# Patient Record
Sex: FEMALE | Race: BLACK OR AFRICAN AMERICAN | Employment: FULL TIME | URBAN - METROPOLITAN AREA
[De-identification: names, ages, dates, MRNs, and addresses within clinical notes are randomized per-mention and may not be internally consistent; named-entity substitution may affect disease eponyms.]

---

## 2019-01-17 ENCOUNTER — HOSPITAL ENCOUNTER (OUTPATIENT)
Dept: PREADMISSION TESTING | Age: 44
Discharge: HOME | End: 2019-01-17
Payer: COMMERCIAL

## 2019-01-17 VITALS — BODY MASS INDEX: 24.11 KG/M2 | WEIGHT: 150 LBS | HEIGHT: 66 IN

## 2019-01-17 LAB
HCT VFR BLD AUTO: 34.2 % (ref 35–45)
HGB BLD-MCNC: 10.4 G/DL (ref 12–16)

## 2019-01-17 PROCEDURE — 85018 HEMOGLOBIN: CPT

## 2019-01-17 RX ORDER — BISMUTH SUBSALICYLATE 262 MG
1 TABLET,CHEWABLE ORAL DAILY
COMMUNITY

## 2019-01-17 RX ORDER — SODIUM CHLORIDE, SODIUM LACTATE, POTASSIUM CHLORIDE, CALCIUM CHLORIDE 600; 310; 30; 20 MG/100ML; MG/100ML; MG/100ML; MG/100ML
125 INJECTION, SOLUTION INTRAVENOUS CONTINUOUS
Status: CANCELLED | OUTPATIENT
Start: 2019-01-17

## 2019-01-17 NOTE — PERIOP NOTES
Hold supplements 2 weeks prior to dosDenies any prostheticsDenies sleep apneaPatient states family physician is aware of upcoming procedure/surgeryDenies family history of anesthesia complicationsDenies shortness of breath nor chest pain while climbing stairsPlease be aware that due to unforeseen circumstances, delays may occur and your patience will be appreciatedPlease remember to bring in your copay on dosDo not put any lotion, jewelry, makeup, fingernail or toenail polish; no wigs, no private piercings; no tictac,gum and mouthwash

## 2019-01-30 NOTE — H&P
Assessment/Plan # Detail Type Description 1. Assessment Localized swelling, mass and lump, trunk (R22.2). Impression likely benign lipoma, but getting larger, some discomfort, discussed excision in OR, risks and benefits. Patient Plan excision right upper back mass This 44year old female presents for Lipoma. History of Present Illness: 1. Lipoma The swelling occurred 1 year ago and is constant. The severity is moderate and has worsened. The patient denies any history of trauma. Back. Previous diagnostic studies and/or treatments that have been performed/administered include. Previous diagnostic studies and/or treatments that have not been performed/administered include a biopsy, an excision and radiographs. The swelling is aggravated by local pressure and movement. Interventions the patient has tried have not provided any relief. The patient denies any bleeding, chest pain, dyspnea, fever, headache, lymphadenopathy, numbness, skin discoloration and weight loss. Medical/Surgical/Interim History Reviewed, no change. Last detailed document date:11/14/2018. Family History: 
Reviewed, no changes. Last detailed document date:11/14/2018. Social History: 
Reviewed, no changes. Last detailed document date: 11/14/2018. Medication Reconciliation Medications reconciled today. Allergies: 
Ingredient Reaction (Severity) Medication Name Comment NO KNOWN ALLERGIES Reviewed, no changes. Review of Systems System Neg/Pos Details Constitutional Negative Fever, Night sweats and Weight loss. ENMT Negative Hearing loss, Tinnitus, Vertigo and Voice change. Eyes Negative Diplopia and Vision loss. Respiratory Negative Asthma, Cough, Dyspnea, Hemoptysis, Known TB exposure and Wheezing. Cardio Negative Chest pain, Claudication, Edema, Irregular heartbeat/palpitations and Thrombophlebitis. GI Negative Bloating, Dysphagia, Hemorrhoids, Jaundice and Reflux.  Negative Dysuria, Nocturia, Passage stone/gravel and Urinary incontinence. Endocrine Negative Cold intolerance and Goiter. Neuro Negative Focal weakness, Headache, Numbness, Paresthesia, Seizures and Syncope. Integumentary Negative Change in shape/size of mole(s), Pigment change and Skin lesion. MS Negative Back pain, Bone/joint symptoms and Muscle weakness. Hema/Lymph Negative Easy bleeding, Easy bruising and Lymphadenopathy. Allergic/Immuno Negative Contact allergy and Contact dermatitis. Reproductive Positive Menses (Last menses was 11/23/2018). Reproductive Comments Mammo 1/18. Vital Signs Gynecologic History Last menses was 11/23/2018. Height Time ft in cm Last Measured Height Position 1:45 PM 5.0 4.00 162.56 11/27/2018 Standing Weight/BSA/BMI Time lb oz kg Context BMI kg/m2 BSA m2  
1:45 .00  68.039 dressed with shoes 25.75 Blood Pressure Time BP mm/Hg Position Side Site Method Cuff Size 1:45 /75 sitting right arm automatic adult Temperature/Pulse/Respiration Time Temp F Temp C Temp Site Pulse/min Pattern Resp/ min 1:45 PM 98.20 36.78 ear 70 regular Measured By Time Measured by  
1:45 PM Georgina Jimenez Physical Exam: 
Exam Findings Details Back/Spine Comments R upper back with vague 6 cm soft subq mass Constitutional Normal No acute distress. Well nourished. Well developed. Eyes Normal General - Right: Normal, Left: Normal. Sclera - Right: Normal, Left: Normal.  
Neck Exam Normal Inspection - Normal.  
Respiratory Normal Cough - Absent. Effort - Normal.  
Cardiovascular Normal Inspection - JVD: Absent. Heart rate - Regular rate. Rhythm - Regular. Skin * Inspection - General inspection: warm and dry. Musculoskeletal Normal Visual overview of all four extremities is normal. Gait - Normal.  
Extremity Normal No Cyanosis. No Edema.   
Neurological Normal Level of consciousness - Normal. Orientation - Normal. Memory - Normal.  
 Psychiatric Normal Orientation - Oriented to time, place, person & situation. No agitation. Not anxious. Appropriate mood and affect. Active Patient Care Team Members Name Contact Agency Type Support Role Relationship Active Date Inactive Date Specialty Becky Provider: Adelfo Griffith 11/28/2018 9:36 AM  
Document generated by:  Frantz Hinson 11/28/2018 09:36 AM  
 
 
 
 
 
 
 
 
 
 
 
 
Electronically signed by Frantz Hinson MD on 11/29/2018 04:34 PM

## 2019-01-31 ENCOUNTER — ANESTHESIA (OUTPATIENT)
Dept: SURGERY | Age: 44
End: 2019-01-31
Payer: COMMERCIAL

## 2019-01-31 ENCOUNTER — ANESTHESIA EVENT (OUTPATIENT)
Dept: SURGERY | Age: 44
End: 2019-01-31
Payer: COMMERCIAL

## 2019-01-31 ENCOUNTER — HOSPITAL ENCOUNTER (OUTPATIENT)
Age: 44
Setting detail: OUTPATIENT SURGERY
Discharge: HOME | End: 2019-01-31
Attending: SURGERY | Admitting: SURGERY
Payer: COMMERCIAL

## 2019-01-31 VITALS
DIASTOLIC BLOOD PRESSURE: 71 MMHG | SYSTOLIC BLOOD PRESSURE: 114 MMHG | BODY MASS INDEX: 26.01 KG/M2 | RESPIRATION RATE: 18 BRPM | HEART RATE: 76 BPM | OXYGEN SATURATION: 100 % | HEIGHT: 65 IN | TEMPERATURE: 97.6 F | WEIGHT: 156.13 LBS

## 2019-01-31 DIAGNOSIS — D17.1 LIPOMA OF BACK: Primary | ICD-10-CM

## 2019-01-31 LAB — HCG UR QL: NEGATIVE

## 2019-01-31 PROCEDURE — 77030020782 HC GWN BAIR PAWS FLX 3M -B: Performed by: SURGERY

## 2019-01-31 PROCEDURE — 74011000250 HC RX REV CODE- 250: Performed by: SURGERY

## 2019-01-31 PROCEDURE — 74011250636 HC RX REV CODE- 250/636: Performed by: SURGERY

## 2019-01-31 PROCEDURE — 76060000031 HC ANESTHESIA FIRST 0.5 HR: Performed by: SURGERY

## 2019-01-31 PROCEDURE — 77030008556 HC TBNG SMK EVAC COVD -A: Performed by: SURGERY

## 2019-01-31 PROCEDURE — 74011000250 HC RX REV CODE- 250

## 2019-01-31 PROCEDURE — 81025 URINE PREGNANCY TEST: CPT

## 2019-01-31 PROCEDURE — 76010000154 HC OR TIME FIRST 0.5 HR: Performed by: SURGERY

## 2019-01-31 PROCEDURE — 77030002933 HC SUT MCRYL J&J -A: Performed by: SURGERY

## 2019-01-31 PROCEDURE — 88304 TISSUE EXAM BY PATHOLOGIST: CPT

## 2019-01-31 PROCEDURE — 76210000021 HC REC RM PH II 0.5 TO 1 HR: Performed by: SURGERY

## 2019-01-31 PROCEDURE — 77030011267 HC ELECTRD BLD COVD -A: Performed by: SURGERY

## 2019-01-31 PROCEDURE — 76210000063 HC OR PH I REC FIRST 0.5 HR: Performed by: SURGERY

## 2019-01-31 PROCEDURE — 74011250636 HC RX REV CODE- 250/636

## 2019-01-31 PROCEDURE — 77030032490 HC SLV COMPR SCD KNE COVD -B: Performed by: SURGERY

## 2019-01-31 RX ORDER — MAGNESIUM SULFATE 100 %
4 CRYSTALS MISCELLANEOUS AS NEEDED
Status: CANCELLED | OUTPATIENT
Start: 2019-01-31

## 2019-01-31 RX ORDER — DEXTROSE 50 % IN WATER (D50W) INTRAVENOUS SYRINGE
25-50 AS NEEDED
Status: CANCELLED | OUTPATIENT
Start: 2019-01-31

## 2019-01-31 RX ORDER — INSULIN LISPRO 100 [IU]/ML
INJECTION, SOLUTION INTRAVENOUS; SUBCUTANEOUS ONCE
Status: CANCELLED | OUTPATIENT
Start: 2019-01-31 | End: 2019-01-31

## 2019-01-31 RX ORDER — SODIUM CHLORIDE, SODIUM LACTATE, POTASSIUM CHLORIDE, CALCIUM CHLORIDE 600; 310; 30; 20 MG/100ML; MG/100ML; MG/100ML; MG/100ML
125 INJECTION, SOLUTION INTRAVENOUS CONTINUOUS
Status: DISCONTINUED | OUTPATIENT
Start: 2019-01-31 | End: 2019-01-31 | Stop reason: HOSPADM

## 2019-01-31 RX ORDER — FENTANYL CITRATE 50 UG/ML
INJECTION, SOLUTION INTRAMUSCULAR; INTRAVENOUS AS NEEDED
Status: DISCONTINUED | OUTPATIENT
Start: 2019-01-31 | End: 2019-01-31 | Stop reason: HOSPADM

## 2019-01-31 RX ORDER — BUPIVACAINE HYDROCHLORIDE 5 MG/ML
INJECTION, SOLUTION EPIDURAL; INTRACAUDAL AS NEEDED
Status: DISCONTINUED | OUTPATIENT
Start: 2019-01-31 | End: 2019-01-31 | Stop reason: HOSPADM

## 2019-01-31 RX ORDER — NALOXONE HYDROCHLORIDE 0.4 MG/ML
0.2 INJECTION, SOLUTION INTRAMUSCULAR; INTRAVENOUS; SUBCUTANEOUS AS NEEDED
Status: CANCELLED | OUTPATIENT
Start: 2019-01-31

## 2019-01-31 RX ORDER — SODIUM CHLORIDE 0.9 % (FLUSH) 0.9 %
5-40 SYRINGE (ML) INJECTION EVERY 8 HOURS
Status: CANCELLED | OUTPATIENT
Start: 2019-01-31

## 2019-01-31 RX ORDER — SODIUM CHLORIDE 0.9 % (FLUSH) 0.9 %
5-40 SYRINGE (ML) INJECTION AS NEEDED
Status: CANCELLED | OUTPATIENT
Start: 2019-01-31

## 2019-01-31 RX ORDER — PROPOFOL 10 MG/ML
INJECTION, EMULSION INTRAVENOUS
Status: DISCONTINUED | OUTPATIENT
Start: 2019-01-31 | End: 2019-01-31 | Stop reason: HOSPADM

## 2019-01-31 RX ORDER — FENTANYL CITRATE 50 UG/ML
25 INJECTION, SOLUTION INTRAMUSCULAR; INTRAVENOUS
Status: CANCELLED | OUTPATIENT
Start: 2019-01-31

## 2019-01-31 RX ORDER — EPHEDRINE SULFATE/0.9% NACL/PF 25 MG/5 ML
SYRINGE (ML) INTRAVENOUS AS NEEDED
Status: DISCONTINUED | OUTPATIENT
Start: 2019-01-31 | End: 2019-01-31 | Stop reason: HOSPADM

## 2019-01-31 RX ORDER — ONDANSETRON 2 MG/ML
INJECTION INTRAMUSCULAR; INTRAVENOUS AS NEEDED
Status: DISCONTINUED | OUTPATIENT
Start: 2019-01-31 | End: 2019-01-31 | Stop reason: HOSPADM

## 2019-01-31 RX ORDER — MIDAZOLAM HYDROCHLORIDE 1 MG/ML
INJECTION, SOLUTION INTRAMUSCULAR; INTRAVENOUS AS NEEDED
Status: DISCONTINUED | OUTPATIENT
Start: 2019-01-31 | End: 2019-01-31 | Stop reason: HOSPADM

## 2019-01-31 RX ORDER — SODIUM CHLORIDE, SODIUM LACTATE, POTASSIUM CHLORIDE, CALCIUM CHLORIDE 600; 310; 30; 20 MG/100ML; MG/100ML; MG/100ML; MG/100ML
100 INJECTION, SOLUTION INTRAVENOUS CONTINUOUS
Status: CANCELLED | OUTPATIENT
Start: 2019-01-31

## 2019-01-31 RX ORDER — HYDROCODONE BITARTRATE AND ACETAMINOPHEN 5; 300 MG/1; MG/1
1 TABLET ORAL
Qty: 25 TAB | Refills: 0 | Status: SHIPPED | OUTPATIENT
Start: 2019-01-31

## 2019-01-31 RX ADMIN — MIDAZOLAM HYDROCHLORIDE 2 MG: 1 INJECTION, SOLUTION INTRAMUSCULAR; INTRAVENOUS at 08:40

## 2019-01-31 RX ADMIN — Medication 10 MG: at 09:06

## 2019-01-31 RX ADMIN — FENTANYL CITRATE 50 MCG: 50 INJECTION, SOLUTION INTRAMUSCULAR; INTRAVENOUS at 08:40

## 2019-01-31 RX ADMIN — PROPOFOL 100 MCG/KG/MIN: 10 INJECTION, EMULSION INTRAVENOUS at 08:45

## 2019-01-31 RX ADMIN — SODIUM CHLORIDE, SODIUM LACTATE, POTASSIUM CHLORIDE, AND CALCIUM CHLORIDE 125 ML/HR: 600; 310; 30; 20 INJECTION, SOLUTION INTRAVENOUS at 06:43

## 2019-01-31 RX ADMIN — FENTANYL CITRATE 50 MCG: 50 INJECTION, SOLUTION INTRAMUSCULAR; INTRAVENOUS at 08:45

## 2019-01-31 RX ADMIN — ONDANSETRON 4 MG: 2 INJECTION INTRAMUSCULAR; INTRAVENOUS at 08:40

## 2019-01-31 NOTE — PERIOP NOTES
Arrived to Phase 2 - alert but drowsy - assisted to recliner - surgical dressing clean and dry- ice pack applied

## 2019-01-31 NOTE — PERIOP NOTES
Urine pregnancy results Negative and verified with LAYLA Leavitt Dual skin assessment completed by Jemima OCONNOR and Frankie Epps RN.   
Esther Eden

## 2019-01-31 NOTE — INTERVAL H&P NOTE
H&P Update: 
Charlotte Coker was seen and examined. History and physical has been reviewed. The patient has been examined. There have been no significant clinical changes since the completion of the originally dated History and Physical. 
Patient identified by surgeon; surgical site was confirmed by patient and surgeon.  
 
Signed By: Clari Gorman MD   
 January 31, 2019 8:36 AM

## 2019-01-31 NOTE — ANESTHESIA PREPROCEDURE EVALUATION
Anesthetic History No history of anesthetic complications Review of Systems / Medical History Patient summary reviewed, nursing notes reviewed and pertinent labs reviewed Pulmonary Within defined limits Neuro/Psych Within defined limits Cardiovascular Within defined limits GI/Hepatic/Renal 
Within defined limits Endo/Other Within defined limits Other Findings Physical Exam 
 
Airway Mallampati: II 
TM Distance: 4 - 6 cm Neck ROM: normal range of motion Mouth opening: Normal 
 
 Cardiovascular Rhythm: regular Rate: normal 
 
 
 
 Dental 
 
 
Comments: Lower retainer. Pulmonary Breath sounds clear to auscultation Abdominal 
GI exam deferred Other Findings Anesthetic Plan ASA: 1 Anesthesia type: MAC Induction: Intravenous Anesthetic plan and risks discussed with: Patient and Family

## 2019-01-31 NOTE — OP NOTES
[address] REPORT    Negrito Weinstein.  MR#: [id number]  : 1975  ACCOUNT #: [de-identified]   DATE OF SERVICE: 2019    PREOPERATIVE DIAGNOSIS:  Right upper back mass. POSTOPERATIVE DIAGNOSIS:  Submuscular lipoma, right upper back, measuring 5 cm. PROCEDURE PERFORMED:  Excision of submuscular lipoma, right upper back, measuring 5 cm. SURGEON:  Tutu Amezquita MD    ANESTHESIA:  MAC.    INDICATION FOR PROCEDURE:  This is a 62-year-old female with a relatively large mass in the upper back, which has gotten larger, and she is brought to the operating room for excision. DESCRIPTION OF THE PROCEDURE:  The patient was brought to the operating room and placed on the table in the left lateral decubitus position. After placing monitors and adequate IV sedation,  the right upper back was prepped and draped in the usual sterile fashion. Marcaine was injected locally in the skin and subcutaneous tissue. A slightly oblique incision was made through the skin down to subcutaneous tissue. Dissection went to the muscle. It was obvious that the lipoma was deep to the muscle. The muscle was then divided in the direction of the fibers and retracted. Then, a lipomatous mass was excised from the surrounding tissue. It was sent to Pathology in formalin. There was good hemostasis with the electrocautery. Subcutaneous tissue was reapproximated with interrupted 3-0 Monocryl suture and 4-0 Monocryl was used to reapproximate the skin. Steri-Strips were applied and the wound was dressed sterilely. The sponge, instrument and needle count was correct at the end of the procedure. ESTIMATED BLOOD LOSS:  3 mL. SPECIMENS REMOVED:  Lipoma. DRAINS:  No drains. IMPLANTS:  No implants. COMPLICATIONS:  No complications. ASSISTANT:  Mustapha Bradley. MD Al Daina / Dora Van  D: 2019 15:36     T: 2019 15:53  JOB #: [id number]

## 2019-01-31 NOTE — DISCHARGE INSTRUCTIONS
Ice and elevation  Ambulate multiple times daily  Resume pre hospital diet  Drink plenty of fluids  Remove surgical dressing and shower in 2 days  Take prescription as directed  Follow up with Dr. Priti Lozano in 1 week        DISCHARGE SUMMARY from Nurse    PATIENT INSTRUCTIONS:    After general anesthesia or intravenous sedation, for 24 hours or while taking prescription Narcotics:  · Limit your activities  · Do not drive and operate hazardous machinery  · Do not make important personal or business decisions  · Do  not drink alcoholic beverages  · If you have not urinated within 8 hours after discharge, please contact your surgeon on call. Report the following to your surgeon:  · Excessive pain, swelling, redness or odor of or around the surgical area  · Temperature over 100.5  · Nausea and vomiting lasting longer than 4 hours or if unable to take medications  · Any signs of decreased circulation or nerve impairment to extremity: change in color, persistent  numbness, tingling, coldness or increase pain  · Any questions    What to do at Home:  Recommended activity: Ambulate in house and No driving while on analgesics,     If you experience any of the following symptoms fever, chills, uncontrollable pain , active bleeding or drainage, please follow up with Dr. Priti Lozano. *  Please give a list of your current medications to your Primary Care Provider. *  Please update this list whenever your medications are discontinued, doses are      changed, or new medications (including over-the-counter products) are added. *  Please carry medication information at all times in case of emergency situations. These are general instructions for a healthy lifestyle:    No smoking/ No tobacco products/ Avoid exposure to second hand smoke  Surgeon General's Warning:  Quitting smoking now greatly reduces serious risk to your health.     Obesity, smoking, and sedentary lifestyle greatly increases your risk for illness    A healthy diet, regular physical exercise & weight monitoring are important for maintaining a healthy lifestyle    You may be retaining fluid if you have a history of heart failure or if you experience any of the following symptoms:  Weight gain of 3 pounds or more overnight or 5 pounds in a week, increased swelling in our hands or feet or shortness of breath while lying flat in bed. Please call your doctor as soon as you notice any of these symptoms; do not wait until your next office visit. Recognize signs and symptoms of STROKE:    F-face looks uneven    A-arms unable to move or move unevenly    S-speech slurred or non-existent    T-time-call 911 as soon as signs and symptoms begin-DO NOT go       Back to bed or wait to see if you get better-TIME IS BRAIN. Warning Signs of HEART ATTACK     Call 911 if you have these symptoms:   Chest discomfort. Most heart attacks involve discomfort in the center of the chest that lasts more than a few minutes, or that goes away and comes back. It can feel like uncomfortable pressure, squeezing, fullness, or pain.  Discomfort in other areas of the upper body. Symptoms can include pain or discomfort in one or both arms, the back, neck, jaw, or stomach.  Shortness of breath with or without chest discomfort.  Other signs may include breaking out in a cold sweat, nausea, or lightheadedness. Don't wait more than five minutes to call 911 - MINUTES MATTER! Fast action can save your life. Calling 911 is almost always the fastest way to get lifesaving treatment. Emergency Medical Services staff can begin treatment when they arrive -- up to an hour sooner than if someone gets to the hospital by car. Patient armband removed and shredded    The discharge information has been reviewed with the patient and caregiver. The patient and caregiver verbalized understanding.   Discharge medications reviewed with the patient and caregiver and appropriate educational materials and side effects teaching were provided.   ___________________________________________________________________________________________________________________________________

## 2019-01-31 NOTE — ANESTHESIA POSTPROCEDURE EVALUATION
. 
Post-Anesthesia Evaluation & Assessment Visit Vitals /69 Pulse 71 Temp 36.2 °C (97.1 °F) Resp 14 Ht 5' 4.5\" (1.638 m) Wt 70.8 kg (156 lb 2 oz) SpO2 99% BMI 26.38 kg/m² Nausea/Vomiting: no nausea Post-operative hydration adequate. Pain score (VAS): 0 Mental status & Level of consciousness: alert and oriented x 3 Neurological status: moves all extremities, sensation grossly intact Pulmonary status: airway patent, no supplemental oxygen required Complications related to anesthesia: none Additional comments:

## 2023-02-23 ENCOUNTER — HOSPITAL ENCOUNTER (OUTPATIENT)
Facility: HOSPITAL | Age: 48
Setting detail: OUTPATIENT SURGERY
Discharge: HOME | End: 2023-02-23
Attending: INTERNAL MEDICINE | Admitting: INTERNAL MEDICINE
Payer: COMMERCIAL

## 2023-02-23 ENCOUNTER — ANESTHESIA EVENT (OUTPATIENT)
Facility: HOSPITAL | Age: 48
End: 2023-02-23
Payer: COMMERCIAL

## 2023-02-23 ENCOUNTER — ANESTHESIA (OUTPATIENT)
Facility: HOSPITAL | Age: 48
End: 2023-02-23
Payer: COMMERCIAL

## 2023-02-23 VITALS
TEMPERATURE: 98.2 F | HEIGHT: 65 IN | BODY MASS INDEX: 25.22 KG/M2 | RESPIRATION RATE: 14 BRPM | HEART RATE: 69 BPM | WEIGHT: 151.4 LBS | DIASTOLIC BLOOD PRESSURE: 80 MMHG | OXYGEN SATURATION: 100 % | SYSTOLIC BLOOD PRESSURE: 129 MMHG

## 2023-02-23 PROCEDURE — 2580000003 HC RX 258: Performed by: ANESTHESIOLOGY

## 2023-02-23 PROCEDURE — 7100000010 HC PHASE II RECOVERY - FIRST 15 MIN: Performed by: INTERNAL MEDICINE

## 2023-02-23 PROCEDURE — 3600007512: Performed by: INTERNAL MEDICINE

## 2023-02-23 PROCEDURE — 3700000001 HC ADD 15 MINUTES (ANESTHESIA): Performed by: INTERNAL MEDICINE

## 2023-02-23 PROCEDURE — 2709999900 HC NON-CHARGEABLE SUPPLY: Performed by: INTERNAL MEDICINE

## 2023-02-23 PROCEDURE — 2500000003 HC RX 250 WO HCPCS: Performed by: NURSE ANESTHETIST, CERTIFIED REGISTERED

## 2023-02-23 PROCEDURE — 7100000011 HC PHASE II RECOVERY - ADDTL 15 MIN: Performed by: INTERNAL MEDICINE

## 2023-02-23 PROCEDURE — 88305 TISSUE EXAM BY PATHOLOGIST: CPT

## 2023-02-23 PROCEDURE — 3700000000 HC ANESTHESIA ATTENDED CARE: Performed by: INTERNAL MEDICINE

## 2023-02-23 PROCEDURE — 3600007502: Performed by: INTERNAL MEDICINE

## 2023-02-23 PROCEDURE — 6360000002 HC RX W HCPCS: Performed by: NURSE ANESTHETIST, CERTIFIED REGISTERED

## 2023-02-23 RX ORDER — SODIUM CHLORIDE 9 MG/ML
INJECTION, SOLUTION INTRAVENOUS PRN
Status: DISCONTINUED | OUTPATIENT
Start: 2023-02-23 | End: 2023-02-23 | Stop reason: HOSPADM

## 2023-02-23 RX ORDER — SODIUM CHLORIDE 0.9 % (FLUSH) 0.9 %
5-40 SYRINGE (ML) INJECTION PRN
Status: CANCELLED | OUTPATIENT
Start: 2023-02-23

## 2023-02-23 RX ORDER — FERROUS SULFATE 325(65) MG
325 TABLET ORAL
COMMUNITY

## 2023-02-23 RX ORDER — FENTANYL CITRATE 50 UG/ML
25 INJECTION, SOLUTION INTRAMUSCULAR; INTRAVENOUS EVERY 5 MIN PRN
Status: CANCELLED | OUTPATIENT
Start: 2023-02-23

## 2023-02-23 RX ORDER — ONDANSETRON 2 MG/ML
4 INJECTION INTRAMUSCULAR; INTRAVENOUS
Status: CANCELLED | OUTPATIENT
Start: 2023-02-23 | End: 2023-02-24

## 2023-02-23 RX ORDER — IPRATROPIUM BROMIDE AND ALBUTEROL SULFATE 2.5; .5 MG/3ML; MG/3ML
1 SOLUTION RESPIRATORY (INHALATION)
Status: CANCELLED | OUTPATIENT
Start: 2023-02-23 | End: 2023-02-24

## 2023-02-23 RX ORDER — CHOLECALCIFEROL (VITAMIN D3) 125 MCG
1 CAPSULE ORAL DAILY
COMMUNITY

## 2023-02-23 RX ORDER — GLYCOPYRROLATE 0.2 MG/ML
INJECTION INTRAMUSCULAR; INTRAVENOUS PRN
Status: DISCONTINUED | OUTPATIENT
Start: 2023-02-23 | End: 2023-02-23 | Stop reason: SDUPTHER

## 2023-02-23 RX ORDER — LIDOCAINE HYDROCHLORIDE 20 MG/ML
INJECTION, SOLUTION EPIDURAL; INFILTRATION; INTRACAUDAL; PERINEURAL PRN
Status: DISCONTINUED | OUTPATIENT
Start: 2023-02-23 | End: 2023-02-23 | Stop reason: SDUPTHER

## 2023-02-23 RX ORDER — DROPERIDOL 2.5 MG/ML
0.62 INJECTION, SOLUTION INTRAMUSCULAR; INTRAVENOUS
Status: CANCELLED | OUTPATIENT
Start: 2023-02-23 | End: 2023-02-24

## 2023-02-23 RX ORDER — HYDROMORPHONE HYDROCHLORIDE 1 MG/ML
0.5 INJECTION, SOLUTION INTRAMUSCULAR; INTRAVENOUS; SUBCUTANEOUS EVERY 5 MIN PRN
Status: CANCELLED | OUTPATIENT
Start: 2023-02-23

## 2023-02-23 RX ORDER — LEVOCETIRIZINE DIHYDROCHLORIDE 5 MG/1
1 TABLET, FILM COATED ORAL AS NEEDED
COMMUNITY
Start: 2022-10-18

## 2023-02-23 RX ORDER — LABETALOL HYDROCHLORIDE 5 MG/ML
10 INJECTION, SOLUTION INTRAVENOUS
Status: CANCELLED | OUTPATIENT
Start: 2023-02-23

## 2023-02-23 RX ORDER — OXYCODONE HYDROCHLORIDE 5 MG/1
5 TABLET ORAL
Status: CANCELLED | OUTPATIENT
Start: 2023-02-23 | End: 2023-02-24

## 2023-02-23 RX ORDER — SODIUM CHLORIDE, SODIUM LACTATE, POTASSIUM CHLORIDE, CALCIUM CHLORIDE 600; 310; 30; 20 MG/100ML; MG/100ML; MG/100ML; MG/100ML
INJECTION, SOLUTION INTRAVENOUS CONTINUOUS
Status: CANCELLED | OUTPATIENT
Start: 2023-02-23

## 2023-02-23 RX ORDER — DIPHENHYDRAMINE HYDROCHLORIDE 50 MG/ML
12.5 INJECTION INTRAMUSCULAR; INTRAVENOUS
Status: CANCELLED | OUTPATIENT
Start: 2023-02-23 | End: 2023-02-24

## 2023-02-23 RX ORDER — MEPERIDINE HYDROCHLORIDE 50 MG/ML
12.5 INJECTION INTRAMUSCULAR; INTRAVENOUS; SUBCUTANEOUS EVERY 5 MIN PRN
Status: CANCELLED | OUTPATIENT
Start: 2023-02-23

## 2023-02-23 RX ORDER — PROPOFOL 10 MG/ML
INJECTION, EMULSION INTRAVENOUS PRN
Status: DISCONTINUED | OUTPATIENT
Start: 2023-02-23 | End: 2023-02-23 | Stop reason: SDUPTHER

## 2023-02-23 RX ORDER — TRAZODONE HYDROCHLORIDE 50 MG/1
1 TABLET ORAL DAILY
COMMUNITY
Start: 2022-10-18

## 2023-02-23 RX ORDER — SODIUM CHLORIDE 0.9 % (FLUSH) 0.9 %
5-40 SYRINGE (ML) INJECTION EVERY 12 HOURS SCHEDULED
Status: CANCELLED | OUTPATIENT
Start: 2023-02-23

## 2023-02-23 RX ADMIN — LIDOCAINE HYDROCHLORIDE 100 MG: 20 INJECTION, SOLUTION EPIDURAL; INFILTRATION; INTRACAUDAL; PERINEURAL at 10:20

## 2023-02-23 RX ADMIN — GLYCOPYRROLATE 0.2 MG: 0.2 INJECTION INTRAMUSCULAR; INTRAVENOUS at 10:13

## 2023-02-23 RX ADMIN — PROPOFOL 100 MG: 10 INJECTION, EMULSION INTRAVENOUS at 10:23

## 2023-02-23 RX ADMIN — PROPOFOL 50 MG: 10 INJECTION, EMULSION INTRAVENOUS at 10:29

## 2023-02-23 RX ADMIN — PROPOFOL 50 MG: 10 INJECTION, EMULSION INTRAVENOUS at 10:38

## 2023-02-23 RX ADMIN — PROPOFOL 100 MG: 10 INJECTION, EMULSION INTRAVENOUS at 10:20

## 2023-02-23 RX ADMIN — PROPOFOL 50 MG: 10 INJECTION, EMULSION INTRAVENOUS at 10:41

## 2023-02-23 RX ADMIN — PROPOFOL 50 MG: 10 INJECTION, EMULSION INTRAVENOUS at 10:26

## 2023-02-23 RX ADMIN — PROPOFOL 50 MG: 10 INJECTION, EMULSION INTRAVENOUS at 10:32

## 2023-02-23 RX ADMIN — PROPOFOL 50 MG: 10 INJECTION, EMULSION INTRAVENOUS at 10:35

## 2023-02-23 RX ADMIN — SODIUM CHLORIDE: 9 INJECTION, SOLUTION INTRAVENOUS at 09:24

## 2023-02-23 ASSESSMENT — PAIN SCALES - GENERAL: PAINLEVEL_OUTOF10: 0

## 2023-02-23 ASSESSMENT — PAIN - FUNCTIONAL ASSESSMENT: PAIN_FUNCTIONAL_ASSESSMENT: 0-10

## 2023-02-23 NOTE — ANESTHESIA PRE PROCEDURE
Department of Anesthesiology  Preprocedure Note       Name:  Temi Shaffer   Age:  47 y.o.  :  1975                                          MRN:  [id number]         Date:  2023      Surgeon: Surgeon(s):  Danya Egan MD    Procedure: Procedure(s):  EGD AND  COLONOSCOPY WITH MAC    Medications prior to admission:   Prior to Admission medications    Medication Sig Start Date End Date Taking? Authorizing Provider   traZODone (DESYREL) 50 MG tablet Take 1 tablet by mouth daily 10/18/22  Yes Historical Provider, MD   levocetirizine (XYZAL) 5 MG tablet Take 1 tablet by mouth as needed 10/18/22  Yes Historical Provider, MD   Multiple Vitamin (MULTIVITAMIN ADULT PO) Take 1 tablet by mouth daily Indications: supplement   Yes Historical Provider, MD   Lactobacillus (PROBIOTIC ACIDOPHILUS) CAPS Take 1 capsule by mouth daily Indications: supplement   Yes Historical Provider, MD   ferrous sulfate (IRON 325) 325 (65 Fe) MG tablet Take 325 mg by mouth daily (with breakfast)    Historical Provider, MD   vitamin D (CHOLECALCIFEROL) 125 MCG (5000 UT) CAPS capsule Take 1 capsule by mouth daily    Historical Provider, MD       Current medications:    Current Facility-Administered Medications   Medication Dose Route Frequency Provider Last Rate Last Admin   • 0.9 % sodium chloride infusion   IntraVENous PRN Cal Snider MD           Allergies:  No Known Allergies    Problem List:  There is no problem list on file for this patient.      Past Medical History:        Diagnosis Date   • Lipoma of abdominal wall    • Lipoma of back        Past Surgical History:        Procedure Laterality Date   • LIPOMA RESECTION N/A     back   • LIPOMA RESECTION      abdomen       Social History:    Social History     Tobacco Use   • Smoking status: Never   • Smokeless tobacco: Never   Substance Use Topics   • Alcohol use: Yes     Alcohol/week: 3.0 standard drinks     Types: 3 Glasses of wine per week                         Counseling given: Not Answered      Vital Signs (Current):   Vitals:    02/23/23 0901   BP: 122/72   Pulse: 65   Resp: 16   Temp: 98.4 °F (36.9 °C)   TempSrc: Oral   SpO2: 100%   Weight: 151 lb 6.4 oz (68.7 kg)   Height: 5' 4.5\" (1.638 m)                                              BP Readings from Last 3 Encounters:   02/23/23 122/72       NPO Status:                                                                                 BMI:   Wt Readings from Last 3 Encounters:   02/23/23 151 lb 6.4 oz (68.7 kg)     Body mass index is 25.59 kg/m². CBC: No results found for: WBC, RBC, HGB, HCT, MCV, RDW, PLT    CMP: No results found for: NA, K, CL, CO2, BUN, CREATININE, GFRAA, AGRATIO, LABGLOM, GLUCOSE, GLU, PROT, CALCIUM, BILITOT, ALKPHOS, AST, ALT    POC Tests: No results for input(s): POCGLU, POCNA, POCK, POCCL, POCBUN, POCHEMO, POCHCT in the last 72 hours.     Coags: No results found for: PROTIME, INR, APTT    HCG (If Applicable): No results found for: PREGTESTUR, PREGSERUM, HCG, HCGQUANT     ABGs: No results found for: PHART, PO2ART, JMB1DEP, GUQ1MBT, BEART, L3TRPEKP     Type & Screen (If Applicable):  No results found for: LABABO, LABRH    Drug/Infectious Status (If Applicable):  No results found for: HIV, HEPCAB    COVID-19 Screening (If Applicable): No results found for: COVID19        Anesthesia Evaluation  Patient summary reviewed and Nursing notes reviewed  Airway: Mallampati: I  TM distance: >3 FB   Neck ROM: full  Mouth opening: > = 3 FB   Dental:          Pulmonary:Negative Pulmonary ROS and normal exam  breath sounds clear to auscultation                             Cardiovascular:Negative CV ROS  Exercise tolerance: good (>4 METS),           Rhythm: regular  Rate: normal                    Neuro/Psych:   Negative Neuro/Psych ROS              GI/Hepatic/Renal: Neg GI/Hepatic/Renal ROS            Endo/Other: Negative Endo/Other ROS                    Abdominal:             Vascular: negative vascular ROS. Other Findings:           Anesthesia Plan      MAC     ASA 1       Induction: intravenous. Anesthetic plan and risks discussed with patient and spouse. Plan discussed with CRNA.                     Sharyn Stephens MD   2/23/2023

## 2023-02-23 NOTE — ANESTHESIA POSTPROCEDURE EVALUATION
Department of Anesthesiology  Postprocedure Note    Patient: Rudy Aguilar  MRN: [id number]  YOB: 1975  Date of evaluation: 2/23/2023      Procedure Summary     Date: 02/23/23 Room / Location: [facility] ENDO  / [facility] ENDOSCOPY    Anesthesia Start: 1013 Anesthesia Stop: 1047    Procedures:       EGD; BIOPSIES; POLYPECTOMY; (Upper GI Region)      COLONOSCOPY WITH MAC (Lower GI Region) Diagnosis:       Iron deficiency anemia, unspecified iron deficiency anemia type      Colon cancer screening      (IRON DEFICIENCY ANEMIA,UNSPECIFIED,ENCOUNTER FOR SCREENING FOR MALIGNANT NEOPLASM OF COLON)    Surgeons: Erick Baldwin MD Responsible Provider: Ki Lujan MD    Anesthesia Type: MAC ASA Status: 1          Anesthesia Type: MAC    Juliana Phase I: Juliana Score: 10    Juliana Phase II: Juliana Score: 10      Anesthesia Post Evaluation    Level of consciousness: awake  Airway patency: patent  Nausea & Vomiting: no nausea and no vomiting  Complications: no  Cardiovascular status: hemodynamically stable  Respiratory status: acceptable and spontaneous ventilation  Hydration status: stable

## 2023-02-23 NOTE — PROCEDURES
Esophagogastroduodenoscopy Procedure Note    Yobani Cruz  [id number]      Indications: Unexplained iron deficiency anemia    Anesthesia/Sedation: MAC anesthesia Propofol    Assistants: Circulator: Elicia Muhammad RN  Endoscopy Technician: Conchis Louis CNA    Pre-Procedure Exam:  Airway: clear   Heart: normal S1and S2    Lungs: clear bilateral  Abdomen: soft, nontender, bowel sounds present and normal in all quadrants   Mental Status: awake, alert, and oriented to person, place, and time      Procedure in Detail:  Informed consent was obtained for the procedure, including conscious sedation. Risks of pancreatitis, infection, perforation, hemorrhage, adverse drug reaction, and aspiration were discussed. The patient was placed in the left lateral decubitus position. Based on the pre-procedure assessment, including review of the patient's medical history, medications, allergies, and review of systems, he had been deemed to be an appropriate candidate for moderate sedation; he was therefore sedated with the medications listed above. He was monitored continuously with electrocardiogram tracing, pulse oximetry, blood pressure monitoring, and direct observation. The  pediaatric colonoscope was inserted into the mouth and advanced under direct vision to third portion of the duodenum. A careful inspection was made as the gastroscope was withdrawn, including a retroflexed view of the proximal stomach; findings and interventions are described below. Appropriate photodocumentation was obtained. Findings:   OROPHARYNX: Cords and pyriform recesses normal.   ESOPHAGUS: The esophagus is normal. The proximal, mid, and distal portions are normal. The Z-Line is intact.    STOMACH: The fundus on antegrade and retroflex views is normal. The body, antrum, and pylorus are normal.   DUODENUM: The bulb and second portions are normal.    Therapies:    biopsy of stomach cardia, antrum  biopsy of duodenal proximal bulb, second portion  Biopsy of gastric polyps    Specimens: Specimens were collected and sent to pathology. Complications:   None; patient tolerated the procedure well. EBL:  Minimal    No prosthetic devices, grafts, tissues, transplant or devices implanted. Attending Attestation:  I performed the procedure. Recommendations:  - Await pathology. Signed by: Rafy Butler MD                     2/23/2023   Colonoscopy Procedure Note    Indications: Routine screening    Anesthesia/Sedation: MAC anesthesia Propofol    Pre-Procedure Exam:  Airway: clear   Heart: normal S1and S2    Lungs: clear bilateral  Abdomen: soft, nontender, bowel sounds present and normal in all quadrants   Mental Status: awake, alert, and oriented to person, place, and time      Procedure in Detail:  Informed consent was obtained for the procedure, including sedation. Risks of perforation, hemorrhage, adverse drug reaction, and aspiration were discussed. The patient was placed in the left lateral decubitus position. Based on the pre-procedure assessment, including review of the patient's medical history, medications, allergies, and review of systems, she had been deemed to be an appropriate candidate for moderate sedation; she was therefore sedated with the medications listed above. The patient was monitored continuously with ECG tracing, pulse oximetry, blood pressure monitoring, and direct observations. A rectal examination was performed. The [id number] was inserted into the rectum and advanced under direct vision to the cecum, which was identified by the ileocecal valve and appendiceal orifice. The quality of the colonic preparation was excellent. A careful inspection was made as the colonoscope was withdrawn, including a retroflexed view of the rectum; findings and interventions are described below. Appropriate photodocumentation was obtained.     ANUS: Anal exam reveals no masses or hemorrhoids, sphincter tone is normal.   RECTUM: Rectal exam reveals no masses or hemorrhoids. SIGMOID COLON: The mucosa is normal with good vascular pattern and without ulcers, diverticula, and polyps. DESCENDING COLON: The mucosa is normal with good vascular pattern and without ulcers, diverticula, and polyps. SPLENIC FLEXURE: The splenic flexure is normal.   TRANSVERSE COLON: The mucosa is normal with good vascular pattern and without ulcers, diverticula, and polyps. HEPATIC FLEXURE: The hepatic flexure is normal.   ASCENDING COLON: The mucosa is normal with good vascular pattern and without ulcers, diverticula, and polyps. CECUM: The appendiceal orifice appears normal. The ileocecal valve appears normal.   TERMINAL ILEUM: The terminal ileum was not entered. Specimens: No specimens were collected. EBL: none    No prosthetic devices, grafts, tissues, transplant or devices implanted. Withdrawal Time: 7    Complications: None; patient tolerated the procedure well. Attending Attestation: I performed the procedure. Recommendations:   - For colon cancer screening in this average-risk patient, colonoscopy may be repeated in 10 years.     Signed By: Abigail Rich MD                      2/23/2023

## 2023-02-23 NOTE — DISCHARGE INSTRUCTIONS
Kaylynn [facility]  [id number]  1975    COLON / EGD DISCHARGE INSTRUCTIONS    Discomfort:  Sore throat- throat lozenges or warm salt water gargle  Redness at IV site- apply warm compress to area; if redness or soreness persist- contact your physician  There may be a slight amount of blood passed from the rectum  Gaseous discomfort- walking, belching will help relieve any discomfort  You should note operate a vehicle for 12 hours  You should not engage in an occupation involving machinery or appliances for rest of today  You may note drink alcoholic beverages for at least 12 hours  Avoid making any critical decisions for at least 24 hour  DIET:   Regular Diet   - however -  remember your colon is empty and a heavy meal will produce gas. Avoid these foods:  vegetables, fried / greasy foods, carbonated drinks for today     ACTIVITY:  You may resume your normal daily activities it is recommended that you spend the remainder of the day resting -  avoid any strenuous activity. CALL M.D. ANY SIGN OF:   Increasing pain, nausea, vomiting  Abdominal distension (swelling)  New increased bleeding (oral or rectal)  Fever (chills)  Pain in chest area  Bloody discharge from nose or mouth  Shortness of breath    Follow-up Instructions: Will send portal message with biopsy results. Repeat colonoscopy in 10 years.                       Kaylynn [facility]  [id number]  1975        DISCHARGE SUMMARY from Nurse    The following personal items collected during your admission are returned to you:   Dental Appliance:    Vision:    Hearing Aid:    Jewelry:    Clothing:    Other Valuables:    Valuables sent to safe: Dose (mL/hr) Propofol : *50 mg    PATIENT INSTRUCTIONS:      DISCHARGE SUMMARY from Nurse    PATIENT INSTRUCTIONS:    After general anesthesia or intravenous sedation, for 24 hours or while taking prescription Narcotics:  Limit your activities  Do not drive and operate hazardous machinery  Do not make important personal or business decisions  Do  not drink alcoholic beverages  If you have not urinated within 8 hours after discharge, please contact your surgeon on call. Report the following to your surgeon:  Excessive pain, swelling, redness or odor of or around the surgical area  Temperature over 100.5  Nausea and vomiting lasting longer than 4 hours or if unable to take medications  Any signs of decreased circulation or nerve impairment to extremity: change in color, persistent  numbness, tingling, coldness or increase pain  Any questions    What to do at Home:  Recommended activity: as directed in post EGD and post colonoscopy instructions. If you experience any of the following symptoms as directed in post EGD and post colonoscopy instructions, please follow up with Dr. Nuvia Baxter. *  Please give a list of your current medications to your Primary Care Provider. *  Please update this list whenever your medications are discontinued, doses are      changed, or new medications (including over-the-counter products) are added. *  Please carry medication information at all times in case of emergency situations. These are general instructions for a healthy lifestyle:    No smoking/ No tobacco products/ Avoid exposure to second hand smoke  Surgeon General's Warning:  Quitting smoking now greatly reduces serious risk to your health. Obesity, smoking, and sedentary lifestyle greatly increases your risk for illness    A healthy diet, regular physical exercise & weight monitoring are important for maintaining a healthy lifestyle    You may be retaining fluid if you have a history of heart failure or if you experience any of the following symptoms:  Weight gain of 3 pounds or more overnight or 5 pounds in a week, increased swelling in our hands or feet or shortness of breath while lying flat in bed.   Please call your doctor as soon as you notice any of these symptoms; do not wait until your next office visit.        The discharge information has been reviewed with the patient and spouse. The patient and spouse verbalized understanding. Discharge medications reviewed with the patient and spouse and appropriate educational materials and side effects teaching were provided.     Patient armband removed and shredded    ___________________________________________________________________________________________________________________________________

## 2023-04-07 NOTE — H&P
[facility]astroenterology [address] [place] [address]  Tel: [phone]  Fax: [phone]    Patient: Janet Preciado   YOB: 1975   Birth Sex: Female      Current Gender: Female      Date:  11/22/2022 2:20 PM    Historian:   self   Visit Type:  Office Visit         Completed Orders (This Visit)  Order Details Reason Side Interpretation Result Initial Treatment Date Region   Weight monitoring          Dietary management education, guidance, and counseling            Assessment/Plan  # Detail Type Description    1. Assessment Iron deficiency anemia (D50.9). Patient Plan This pleasant 57 y/o lady was referred by Dr. Major Mora for screening colonoscopy and anemia. Review of recent labs show an iron deficiency anemia. She is still menstruating and does share she has a heavier flow. In September, she underwent an ablation for fibroids. She is currently on ferrous sulfate, will schedule her for EGD. The risks, benefits, adverse reactions were discussed in detail today with the patient. This includes, but is not limited to, the risk of bleeding, infections, perforation, death, missed lesions, reactions to anesthesia/sedation, other. They understand and agree to undergo the procedure. undiagnosed new problem with uncertain prognosis  labs reviewed   previous notes from [facility] reviewed  tests  ordered. 2. Assessment Encounter for screening for malignant neoplasm of colon (Z12.11). Patient Plan This is her first colonoscopy. Pertinent negatives include personal history of cancer, changes in bowel habits,  hematochezia, abdominal pain, n&v and unintentional weight loss. She had a paternal uncle who was diagnosed with CRC at 76. Schedule for COLO with MAC, miralax prep. The risks, benefits, adverse reactions were discussed in detail today with the patient.   This includes, but is not limited to, the risk of bleeding, infections, perforation, death, missed lesions, reactions to anesthesia/sedation, other. Patient understands and agrees to undergo the procedure. 3. Assessment Body mass index (BMI) 26.0-26.9, adult (T00.79). Plan Orders Today's instructions / counseling include(s) Dietary management education, guidance, and counseling. Weight monitoring              This 56year old  patient was referred by Gabo Demarco. This 56year old female presents for Colon Cancer Screening and Anemia. History of Present Illness  1. Colon Cancer Screening   2. Anemia     Problem List  Problem List reviewed. Problem Description Onset Date Chronic Clinical Status Notes   Hypercholesterolemia 10/18/2022 Y     Anemia 10/18/2022 Y       Past Medical/Surgical History   (Detailed)  Patient reported no relevant past medical/surgical history. Obstetric History  Not currently pregnant. Family History   (Detailed)    Relationship Family Member Name  Age at Death Condition Onset Age Cause of Death   Father    Diabetes mellitus type 2  N   Father    Hypertension  N   Father    Lymphoma 80 Y   Mother    Cancer, breast 70 N     Social History  (Detailed)  Tobacco use reviewed. Preferred language is [place]. Education/Employment/Occupation  Employment History Status Retired Restrictions            Marital Status/Family/Social Support  Marital status:      Children  Has children:  1 son(s). 2 daughter(s). Smoking status: Never smoker. Tobacco Screening  Patient has never used tobacco. Patient has not used tobacco in the last 30 days. Patient has not used smokeless tobacco in the last 30 days. Smoking Status  Type Smoking Status Usage Per Day Years Used Pack Years Total Pack Years    Never smoker         Alcohol  There is a history of alcohol use. Type: Wine. 1 glass consumed daily. Caffeine  The patient uses caffeine: tea - 1 cup a day.     Home Environment/Safety  Uses seat belts.       Medications (active prior to today)  Medication Instructions Start Date Stop Date Refilled Elsewhere   levocetirizine 5 mg tablet take 1 tablet by oral route  every day in the evening 10/18/2022   N   trazodone 50 mg tablet take 1 tablet by oral route  every day at bedtime 10/18/2022   N     Patient Status   Completed with information received for patient in a summary of care record. Medication Reconciliation  Medications reconciled today. Medication Reviewed  Adherence Medication Name Sig Desc Elsewhere Status   taking as directed levocetirizine 5 mg tablet take 1 tablet by oral route  every day in the evening N Verified   taking as directed trazodone 50 mg tablet take 1 tablet by oral route  every day at bedtime N Verified     Medications (Added, Continued or Stopped today)  Start Date Medication Directions PRN Status PRN Reason Instruction Stop Date   10/18/2022 levocetirizine 5 mg tablet take 1 tablet by oral route  every day in the evening N      10/18/2022 trazodone 50 mg tablet take 1 tablet by oral route  every day at bedtime N        Allergies  Ingredient Reaction (Severity) Medication Name Comment   NO KNOWN ALLERGIES        Reviewed, no changes. Review of Systems  System Neg/Pos Details   Constitutional Negative Chills, Fever and Weight loss. ENMT Negative Ear infections and Nasal congestion. Respiratory Negative Chronic cough, Dyspnea and Wheezing. Cardio Negative Chest pain. GI Negative Abdominal mass, Abdominal pain, Bloating, Blood in stool, Change in appetite, Change in bowel habits, Constipation, Decreased appetite, Diarrhea, Dysphagia, Fecal incontinence, Flatulence, Heartburn, Hematemesis, Hemorrhoids, Increased appetite, Jaundice, Melena, Nausea, Odynophagia, Rectal bleeding, Reflux and Vomiting.  Negative Dysuria. Endocrine Negative Cold intolerance and Heat intolerance. Neuro Negative Dizziness and Headache. Psych Negative Anxiety and Depression. MS Negative Back pain and Joint pain. Hema/Lymph Negative Easy bleeding and Easy bruising. Vital Signs   Height  Time ft in cm Last Measured Height Position   2:38 PM 5.0 4.50 163.83 11/11/2019 Standing     Weight/BSA/BMI  Time lb oz kg Context BMI kg/m2 BSA m2   2:38 .00  72.121 dressed with shoes 26.87      Blood Pressure  Time BP mm/Hg Position Side Site Method Cuff Size   2:38 /70 sitting right arm manual      Temperature/Pulse/Respiration  Time Temp F Temp C Temp Site Pulse/min Pattern Resp/ min   2:38 PM 98.30 36.83  74       Pulse Oximetry/FIO2  Time Pulse Ox (Rest %) Pulse Ox (Amb %) O2 Sat O2 L/Min Timing FiO2 % L/min Delivery Method Finger Probe   2:38 PM 98             Measured by  Time Measured by   2:38 PM Lily Penn     Physical  Exam  Exam Findings Details   Constitutional Normal No acute distress. Well nourished. Well developed. Head/Face Normal Facial features - Normal. Eyebrows - Normal.   Eyes Normal General - Right: Normal, Left: Normal. Lids/external - Right: Normal, Left: Normal. Conjunctiva - Right: Normal, Left: Normal. Sclera - Right: Normal, Left: Normal.   Ears Normal Inspection - Right: Normal, Left: Normal.   Nose/Mouth/Throat Normal External nose - Normal. Lips/teeth/gums - Normal.   Nose/Mouth/Throat Normal External nose - Normal. Septum - Normal. Lips/teeth/gums - Normal. Tongue - Normal.   Neck Exam Normal Inspection - Normal. Palpation - Normal. Thyroid gland - Normal. Range of motion - Normal.   Respiratory Normal Inspection - Normal. Auscultation - Normal. Effort - Normal.   Cardiovascular Normal Inspection - JVD: Absent. Palpation/percussion - PMI normal. Heart rate - Regular rate. Rhythm - Regular. Heart sounds - Normal S1, Normal S2. Abdomen Normal Patient is not obese. Inspection - Normal. Appliance(s) - None. Abdominal muscles - Normal. Auscultation - Normal. Percussion - Normal. Anterior palpation - Normal, No guarding, No rebound.  CVA tenderness - None. Umbilicus - Normal. No abdominal tenderness. No hepatic enlargement. No spleen enlargement. No hernia. No ascites. No palpable mass. Barnett's sign - Normal. No hepatic tenderness. Genitourinary Normal No CVA Tenderness. No flank mass. Skin * Body areas examined - Scalp/hair, Head/face, Neck, Abdomen. Skin Normal Inspection - Normal. Nails - Normal. Hair - Normal.   Musculoskeletal Normal Overview - Normal. Hands - Left: Normal, Right: Normal.   Neurological Normal Level of consciousness - Normal. Orientation - Normal. Memory - Normal. Cranial nerves - Cranial nerves II through XII grossly intact. Sensory - Normal. Motor - Normal. Balance & gait - Normal. Coordination - Normal.   Psychiatric Normal Orientation - Oriented to time, place, person & situation. Not anxious. Behavior is appropriate for age. Sufficient fund of knowledge. Sufficient language. No memory loss. No mood swings. Normal insight. Normal judgment. Active Patient Care Team Members  Name Contact Agency Type Support Role Relationship Active Date Inactive Date Specialty   Alan Perez   Patient provider PCP   [id number] [place] [address]   encounter provider    Gastroenterology       Encounter submitted for review by Tiny ESPINOZA on 11/22/2022 2:51 PM.    Visit details reviewed and approved by supervising provider Milly Chavez MD on 11/23/2022. Document generated by: Milly Chavez 11/23/2022      ----------------------------------------------------------------------------------------------------------------------------------------------------------------------      Electronically signed by Tiny ESPINOZA on 11/23/2022 12:52 PM    Pt seen and examined. NO interval change. Star Wedge Flap Text: The defect edges were debeveled with a #15 scalpel blade.  Given the location of the defect, shape of the defect and the proximity to free margins a star wedge flap was deemed most appropriate.  Using a sterile surgical marker, an appropriate rotation flap was drawn incorporating the defect and placing the expected incisions within the relaxed skin tension lines where possible. The area thus outlined was incised deep to adipose tissue with a #15 scalpel blade.  The skin margins were undermined to an appropriate distance in all directions utilizing iris scissors.

## (undated) DEVICE — CATHETER IV 22GA L1IN BLU POLYUR STR HUB RADPQ PROTCT +

## (undated) DEVICE — KENDALL RADIOLUCENT FOAM MONITORING ELECTRODE RECTANGULAR SHAPE: Brand: KENDALL

## (undated) DEVICE — TRAP SPEC POLYP REM STRNR CLN DSGN MAGNIFYING WIND DISP

## (undated) DEVICE — SUT MONOCRYL PLUS UD 4-0 --

## (undated) DEVICE — WRISTBAND ID AD W2.5XL9.5CM RED VYN ADH CLSR UNI-PRINT

## (undated) DEVICE — MOUTHPIECE ENDOSCP L CTRL OPN AND SIDE PORTS DISP

## (undated) DEVICE — INSULATED BLADE ELECTRODE: Brand: EDGE

## (undated) DEVICE — SPONGE GZ W4XL4IN RAYON POLY 4 PLY NONWOVEN FASTER WICKING

## (undated) DEVICE — ATTACHMENT SMK EVAC FOR ES PNCL ACCUVAC

## (undated) DEVICE — CATHETER PH SUCT 14FR

## (undated) DEVICE — (D)PREP SKN CHLRAPRP APPL 26ML -- CONVERT TO ITEM 371833

## (undated) DEVICE — Device

## (undated) DEVICE — SOLUTION IV 500ML 0.9% SOD CHL FLX CONT

## (undated) DEVICE — SYRINGE MED 50ML LUERSLIP TIP

## (undated) DEVICE — ENDO CARRY-ON PROCEDURE KIT INCLUDES ENZYMATIC SPONGE, GAUZE, BIOHAZARD LABEL, TRAY, LUBRICANT, DIRTY SCOPE LABEL, WATER LABEL, TRAY, DRAWSTRING PAD, AND DEFENDO 4-PIECE KIT.: Brand: ENDO CARRY-ON PROCEDURE KIT

## (undated) DEVICE — NEEDLE FLTR 18GA L1.5IN MEM THK5UM BLNT DISP

## (undated) DEVICE — SYRINGE MED 5ML STD CLR PLAS LUERLOCK TIP N CTRL DISP

## (undated) DEVICE — GOWN,AURORA,NONRNF,XL,30/CS: Brand: MEDLINE

## (undated) DEVICE — 4-PORT MANIFOLD: Brand: NEPTUNE 2

## (undated) DEVICE — MINOR: Brand: MEDLINE INDUSTRIES, INC.

## (undated) DEVICE — KENDALL SCD EXPRESS SLEEVES, KNEE LENGTH, MEDIUM: Brand: KENDALL SCD

## (undated) DEVICE — SET ADMIN 16ML TBNG L100IN 2 Y INJ SITE IV PIGGY BK DISP (ORDER IN MULIPLES OF 48)

## (undated) DEVICE — GARMENT,MEDLINE,DVT,INT,CALF,MED, GEN2: Brand: MEDLINE

## (undated) DEVICE — SYRINGE 50ML E/T

## (undated) DEVICE — SINGLE PORT MANIFOLD: Brand: NEPTUNE 2

## (undated) DEVICE — NEEDLE SYR 18GA L1.5IN RED PLAS HUB S STL BLNT FILL W/O

## (undated) DEVICE — SYRINGE MED 3ML CLR PLAS STD N CTRL LUERLOCK TIP DISP

## (undated) DEVICE — STERILE POLYISOPRENE POWDER-FREE SURGICAL GLOVES WITH EMOLLIENT COATING: Brand: PROTEXIS

## (undated) DEVICE — TOURNIQUET PHLEB W1XL18IN BLU FLAT RL AND BND REUSE FOR IV

## (undated) DEVICE — CANNULA CUSH AD W/ 14FT TBG

## (undated) DEVICE — EJECTOR SALIVA 6 IN FLX CLR

## (undated) DEVICE — TUBING, SUCTION, 1/4" X 12', STRAIGHT: Brand: MEDLINE

## (undated) DEVICE — SPONGE GZ W4XL4IN COT 12 PLY TYP VII WVN C FLD DSGN STERILE

## (undated) DEVICE — FORCEPS BX CAP 240CM L RAD JAW 4

## (undated) DEVICE — SPONGE GZ W4XL4IN COT 12 PLY TYP VII WVN C FLD DSGN